# Patient Record
Sex: MALE | Race: OTHER | NOT HISPANIC OR LATINO | URBAN - METROPOLITAN AREA
[De-identification: names, ages, dates, MRNs, and addresses within clinical notes are randomized per-mention and may not be internally consistent; named-entity substitution may affect disease eponyms.]

---

## 2021-10-23 ENCOUNTER — EMERGENCY (EMERGENCY)
Facility: HOSPITAL | Age: 31
LOS: 1 days | Discharge: HOME | End: 2021-10-23
Attending: EMERGENCY MEDICINE
Payer: COMMERCIAL

## 2021-10-23 VITALS
OXYGEN SATURATION: 99 % | HEIGHT: 68 IN | RESPIRATION RATE: 18 BRPM | DIASTOLIC BLOOD PRESSURE: 80 MMHG | WEIGHT: 197.98 LBS | SYSTOLIC BLOOD PRESSURE: 138 MMHG | TEMPERATURE: 98 F | HEART RATE: 85 BPM

## 2021-10-23 PROCEDURE — 99283 EMERGENCY DEPT VISIT LOW MDM: CPT

## 2021-10-23 RX ORDER — AMOXICILLIN 250 MG/5ML
875 SUSPENSION, RECONSTITUTED, ORAL (ML) ORAL
Refills: 0 | Status: DISCONTINUED | OUTPATIENT
Start: 2021-10-23 | End: 2021-10-26

## 2021-10-23 RX ORDER — AMOXICILLIN 250 MG/5ML
1 SUSPENSION, RECONSTITUTED, ORAL (ML) ORAL
Qty: 14 | Refills: 0
Start: 2021-10-23 | End: 2021-10-29

## 2021-10-23 RX ADMIN — Medication 150 MILLIGRAM(S): at 13:18

## 2021-10-23 RX ADMIN — Medication 875 MILLIGRAM(S): at 13:18

## 2021-10-23 NOTE — ED ADULT NURSE NOTE - OBJECTIVE STATEMENT
32 YO male no pertinent medical hx c/o R-eye pain and swelling. Patient reports that pain and swelling began on Tuesday, does not recall any injury or foreign body to eye. Reports that swelling has been getting worse since then. Went to urgent care and was prescribed antibiotics with no relief of symptoms. Patient reports watery discharge from eye, able to open eye but not fully. Patient is A&OX4, denies chest pain, SOB, HA, N/V/D, abdominal pain, fever/chills, urinary symptoms, hematuria, weakness, dizziness, numbness, tinging. Peripheral pulses present b/l. Skin warm, dry and pink. Pt placed in position of comfort. Pt educated on call bell system and provided call bell. Bed in lowest position, wheels locked, appropriate side rails raised. Pt denies needs at this time.

## 2021-10-23 NOTE — ED PROVIDER NOTE - NS ED ROS FT
CONSTITUTIONAL: No fevers, no chills  EYES: no visual changes, +eye pain, +tearing of R eye   SKIN: +periorbital swelling   NEURO: no headache

## 2021-10-23 NOTE — ED PROVIDER NOTE - OBJECTIVE STATEMENT
31y M w/ no significant PMHx presenting with R eye swelling and foreign body sensation since Monday. Patient works as , states was working Monday and noticed that evening, a "scratchy" sensation in the eye. Went to Urgent Care on Thursday and was prescribed Erythromycin ointment, but states he only used the ointment once. States this morning woke and endorsed swelling to R upper and lower eyelid. Denies pain with eye movements, fever, purulent drainage from eye, recent trauma. Patient has not taken anything for pain at home prior to arrival.

## 2021-10-23 NOTE — ED PROVIDER NOTE - NSFOLLOWUPINSTRUCTIONS_ED_ALL_ED_FT
IMPORTANT INSTRUCTIONS FROM Dr. THOMPSON:    Please follow up with your personal medical doctor in 24-48 hours.   Bring results from today to your visit.    See ophthalmologist Monday.     If you were advised to take any medications - be sure to review the package insert.    If your symptoms change, get worse or if you have any new symptoms, come to the ER right away.  If you have any questions, call the ER at the phone number on this page.

## 2021-10-23 NOTE — ED PROVIDER NOTE - NSFOLLOWUPCLINICS_GEN_ALL_ED_FT
[facility]  Ophthalmology  [address], [address]  [place] [address]  Phone: [phone]  Fax:   Follow Up Time: Urgent

## 2021-10-23 NOTE — ED PROVIDER NOTE - ATTENDING CONTRIBUTION TO CARE
r eye swelling, no pain to move, no fever, no known inj  from eye wo proptosis, upper lid w more swelling than lower, +/- if there is actually uptake of fluoro at 9 o clock. round reactive w sub nl vis.  prob dactocystitis, abx to cover this and poss pre-septal, cont eyrthro ointment.

## 2021-10-23 NOTE — ED ADULT NURSE NOTE - NSIMPLEMENTINTERV_GEN_ALL_ED
Implemented All Universal Safety Interventions:  [place] to call system. Call bell, personal items and telephone within reach. Instruct patient to call for assistance. Room bathroom lighting operational. Non-slip footwear when patient is off stretcher. Physically safe environment: no spills, clutter or unnecessary equipment. Stretcher in lowest position, wheels locked, appropriate side rails in place.
05-Jul-2018 20:37

## 2021-10-23 NOTE — ED PROVIDER NOTE - PHYSICAL EXAMINATION
Physical Exam:  Gen: NAD, AOx3, non-toxic appearing  HEENT: EOMI, PEERL, normal conjunctiva, no evidence of foreign body in eye, +small corneal abrasion to L upper outer portion of eye, not involving pupil/iris   Neuro: No focal sensory deficits   Skin: +periorbital swelling with serous discharge from R eye   Aria Galvez D.O. -Resident

## 2021-10-23 NOTE — ED PROVIDER NOTE - PATIENT PORTAL LINK FT
You can access the FollowMyHealth Patient Portal offered by [facility] by registering at the following website: [email or web address]. By joining [employer] [employer]s FollowMyHealth portal, you will also be able to view your health information using other applications (apps) compatible with our system.

## 2021-10-23 NOTE — ED PROVIDER NOTE - CLINICAL SUMMARY MEDICAL DECISION MAKING FREE TEXT BOX
31y M w/ no significant PMHx presenting with R eye swelling and foreign body sensation since Monday. Patient with swelling of R upper and lower eyelid, difficulty opening eye. Will examine eye under fluorescein, no concern for orbital cellulitis. Will treat for pre-septal cellulitis as patient is non-compliant with topical eye ointment. Afebrile, well-appearing.

## 2021-10-23 NOTE — ED PROVIDER NOTE - PROGRESS NOTE DETAILS
Leonor MCCLENDON (PGY-3): Endorses improvement of discomfort following tetracaine eye drops. No clinical concern for pre-septal cellulitis at this time. EOMI, no changes to vision. No purulent discharge. Will provide with antibiotics, and Optho f/u Endorses improvement of discomfort following tetracaine eye drops.  EOMI, no changes to vision. No purulent discharge. Will provide with antibiotics, and Optho f/u